# Patient Record
Sex: FEMALE | Race: WHITE
[De-identification: names, ages, dates, MRNs, and addresses within clinical notes are randomized per-mention and may not be internally consistent; named-entity substitution may affect disease eponyms.]

---

## 2017-03-24 ENCOUNTER — HOSPITAL ENCOUNTER (OUTPATIENT)
Dept: HOSPITAL 62 - WI | Age: 72
End: 2017-03-24
Attending: FAMILY MEDICINE
Payer: MEDICARE

## 2017-03-24 DIAGNOSIS — M81.8: Primary | ICD-10-CM

## 2017-03-24 PROCEDURE — 77080 DXA BONE DENSITY AXIAL: CPT

## 2019-09-12 ENCOUNTER — HOSPITAL ENCOUNTER (OUTPATIENT)
Dept: HOSPITAL 62 - SC | Age: 74
Discharge: HOME | End: 2019-09-12
Attending: OPHTHALMOLOGY
Payer: MEDICARE

## 2019-09-12 DIAGNOSIS — H25.811: Primary | ICD-10-CM

## 2019-09-12 DIAGNOSIS — J45.909: ICD-10-CM

## 2019-09-12 DIAGNOSIS — I10: ICD-10-CM

## 2019-09-12 DIAGNOSIS — E66.9: ICD-10-CM

## 2019-09-12 DIAGNOSIS — Z79.82: ICD-10-CM

## 2019-09-12 DIAGNOSIS — E11.9: ICD-10-CM

## 2019-09-12 DIAGNOSIS — Z79.899: ICD-10-CM

## 2019-09-12 DIAGNOSIS — Z79.51: ICD-10-CM

## 2019-09-12 PROCEDURE — 00142 ANES PX ON EYE LENS SURGERY: CPT

## 2019-09-12 PROCEDURE — 66984 XCAPSL CTRC RMVL W/O ECP: CPT

## 2019-09-12 PROCEDURE — V2632 POST CHMBR INTRAOCULAR LENS: HCPCS

## 2019-09-12 RX ADMIN — CYCLOPENTOLATE HYDROCHLORIDE AND PHENYLEPHRINE HYDROCHLORIDE PRN DROP: 2; 10 SOLUTION/ DROPS OPHTHALMIC at 13:00

## 2019-09-12 RX ADMIN — CYCLOPENTOLATE HYDROCHLORIDE AND PHENYLEPHRINE HYDROCHLORIDE PRN DROP: 2; 10 SOLUTION/ DROPS OPHTHALMIC at 12:50

## 2019-09-12 RX ADMIN — BESIFLOXACIN PRN DROP: 6 SUSPENSION OPHTHALMIC at 13:20

## 2019-09-12 RX ADMIN — TROPICAMIDE PRN DROP: 10 SOLUTION/ DROPS OPHTHALMIC at 12:50

## 2019-09-12 RX ADMIN — BESIFLOXACIN PRN DROP: 6 SUSPENSION OPHTHALMIC at 13:34

## 2019-09-12 RX ADMIN — TETRACAINE HYDROCHLORIDE PRN DROP: 5 SOLUTION OPHTHALMIC at 13:15

## 2019-09-12 RX ADMIN — TETRACAINE HYDROCHLORIDE PRN DROP: 5 SOLUTION OPHTHALMIC at 12:40

## 2019-09-12 RX ADMIN — BESIFLOXACIN PRN DROP: 6 SUSPENSION OPHTHALMIC at 12:40

## 2019-09-12 RX ADMIN — TETRACAINE HYDROCHLORIDE PRN DROP: 5 SOLUTION OPHTHALMIC at 13:04

## 2019-09-12 RX ADMIN — DORZOLAMIDE HYDROCHLORIDE AND TIMOLOL MALEATE PRN DROP: 20; 5 SOLUTION OPHTHALMIC at 13:20

## 2019-09-12 RX ADMIN — TROPICAMIDE PRN DROP: 10 SOLUTION/ DROPS OPHTHALMIC at 13:00

## 2019-09-12 RX ADMIN — BESIFLOXACIN PRN DROP: 6 SUSPENSION OPHTHALMIC at 12:50

## 2019-09-12 RX ADMIN — DORZOLAMIDE HYDROCHLORIDE AND TIMOLOL MALEATE PRN DROP: 20; 5 SOLUTION OPHTHALMIC at 13:34

## 2019-09-12 RX ADMIN — TROPICAMIDE PRN DROP: 10 SOLUTION/ DROPS OPHTHALMIC at 12:40

## 2019-09-12 RX ADMIN — CYCLOPENTOLATE HYDROCHLORIDE AND PHENYLEPHRINE HYDROCHLORIDE PRN DROP: 2; 10 SOLUTION/ DROPS OPHTHALMIC at 12:40

## 2019-10-05 NOTE — OPERATIVE REPORT
Operative Report-[facility]


Operative Report: 


Preoperative Diagnosis: Nuclear, Cortical and Posterior Subcapsular Cataract, 

right eye


Postoperative Diagnosis: Nuclear, Cortical and Posterior Subcapsular Cataract, 

right eye


Procedure: Phacoemulsification and posterior chamber intraocular lens implant, 

right eye


Procedure date: September 12,2019





Surgeon: Beau Gutierrez MD


Anesthetist: Darrell Correa CRNA


Anesthesia: Topical with IV Sedation


Complications: none


Tissue to pathology: none


Estimated blood loss: none





Indications for Surgery: 


Ms. Leigh is a 74 zero female who presented to our clinic complaining of 

difficulty seeing to read and drive due to blurry vision in her right eye. On 

examination, she was found to have a best corrected visual acuity of 20/40 in 

the right eye. Ophthalmoscopy revealed a 1+ nuclear, 3+ cortical and 1+ 

posterior subcapsular cataract in the right eye with a normal appearing cornea, 

vitreous, retina and optic nerve.I discussed the findings of the exam with the 

patient. We discussed the risks, benefits and alternatives of cataract 

extraction and intraocular lens implant in the right eye as a means of improving

her vision. Risks that were presented to the patient included infection, 

bleeding, retinal detachment and possible need for additional surgery.The 

patient understood that she may need to wear glasses after surgery. After our 

discussion, the patient indicated her interest in having this procedure 

performed by signing an informed, witnessed concert form.





Report of Procedure:


On the day of surgery, the patient was given a topical application to the right 

eye that consisted of drops of tetracaine 0.5%, tropicamide 1%, Cyclomidril, 

Besivance 0.6% and Acular 0.45%. The patient was then taken to the operating 

room in a supine position in a standard eye bed.  Intravenous sedation was 

administered and she was prepped and draped in the standard ophthalmic fashion. 

A time out was performed to confirm with the surgical site. Attention was then 

directed to the right eye where a paracentesis was created at the 1130 position 

at the corneal limbus with a 15 blade. The anterior chamber was then filled 

with 0.3 mL of 1%  methylparaben free lidocaine and after 30 seconds the 

interchange is filled with viscoelastic material. A 3-plane corneal incision was

then made at the 9 oclock position at the corneal limbus with a keratome. A 

continuous collinear capsulorhexis was then made in the anterior capsule of the 

lens with a cystotome. The lens was hydra dissected used in balanced saline solu

tion. The lens nucleus was removed by phacoemulsification using the stop and 

chop technique. CDE 11.37.  The remaining cortical material was then removed 

from the posterior capsular bag using irrigation and aspiration.The posterior 

capsular bag was filled with viscoelastic material and a lens implant was 

inserted into the posterior capsular bag.The lens implant chosen for this case 

was a one piece acrylic lens from [employer] model [id number] serial number 

[id number] lens castellanos 22.5 D. The lens was removed from its package, inspected

and found to be free of defects. It was loaded into a [employer] D . The 

insured was passed through the temporal limbal wound and the lens with advanced 

into the posterior capsular bag. The lens was centered in the posterior capsular

bag with a [place] spatula.The Visco elastic material was removed from the eye 

using irrigation and aspiration. The wounds were closed by stromal hydration and

they were tested with Weck-racheal sponges and found to have no leaks. Intraocular 

pressure was assessed by manual palpation and found to be within physiologic 

range. The drapes and speculum were removed. Drops of Durezol, Combigan and 

gatifloxacin were instilled in the right eye. Periocular skin was cleaned with 

guys in the eye was covered with a shield. The patient was then taken to the 

recovery room in good condition. She tolerated the procedure very well. She was 

given a prescription for gatifloxacin, Durezol and Ilevro to use every two hours

while awake today. She will return to my clinic for follow-up evaluation 

tomorrow.